# Patient Record
Sex: FEMALE | Race: WHITE | URBAN - METROPOLITAN AREA
[De-identification: names, ages, dates, MRNs, and addresses within clinical notes are randomized per-mention and may not be internally consistent; named-entity substitution may affect disease eponyms.]

---

## 2021-08-17 ENCOUNTER — OFFICE VISIT (OUTPATIENT)
Dept: FAMILY MEDICINE CLINIC | Facility: CLINIC | Age: 26
End: 2021-08-17
Payer: COMMERCIAL

## 2021-08-17 VITALS
TEMPERATURE: 98 F | OXYGEN SATURATION: 98 % | RESPIRATION RATE: 16 BRPM | HEIGHT: 67 IN | DIASTOLIC BLOOD PRESSURE: 60 MMHG | BODY MASS INDEX: 25.9 KG/M2 | HEART RATE: 64 BPM | SYSTOLIC BLOOD PRESSURE: 100 MMHG | WEIGHT: 165 LBS

## 2021-08-17 DIAGNOSIS — R11.0 NAUSEA: ICD-10-CM

## 2021-08-17 DIAGNOSIS — U07.1 COVID-19: Primary | ICD-10-CM

## 2021-08-17 DIAGNOSIS — J02.9 SORE THROAT: ICD-10-CM

## 2021-08-17 LAB
CONTROL LINE PRESENT WITH A CLEAR BACKGROUND (YES/NO): YES YES/NO
KIT LOT #: NORMAL NUMERIC
OPERATOR ID: ABNORMAL
POCT LOT NUMBER: ABNORMAL
RAPID SARS-COV-2 BY PCR: DETECTED
STREP GRP A CUL-SCR: NEGATIVE

## 2021-08-17 PROCEDURE — 99203 OFFICE O/P NEW LOW 30 MIN: CPT | Performed by: FAMILY MEDICINE

## 2021-08-17 PROCEDURE — 3008F BODY MASS INDEX DOCD: CPT | Performed by: FAMILY MEDICINE

## 2021-08-17 PROCEDURE — 87880 STREP A ASSAY W/OPTIC: CPT | Performed by: FAMILY MEDICINE

## 2021-08-17 PROCEDURE — 3074F SYST BP LT 130 MM HG: CPT | Performed by: FAMILY MEDICINE

## 2021-08-17 PROCEDURE — 3078F DIAST BP <80 MM HG: CPT | Performed by: FAMILY MEDICINE

## 2021-08-17 PROCEDURE — U0002 COVID-19 LAB TEST NON-CDC: HCPCS | Performed by: FAMILY MEDICINE

## 2021-08-17 RX ORDER — ONDANSETRON 4 MG/1
4 TABLET, ORALLY DISINTEGRATING ORAL EVERY 6 HOURS PRN
Qty: 10 TABLET | Refills: 0 | Status: SHIPPED | OUTPATIENT
Start: 2021-08-17

## 2021-08-17 RX ORDER — HYDROXYZINE HYDROCHLORIDE 10 MG/1
10 TABLET, FILM COATED ORAL AS NEEDED
COMMUNITY

## 2021-08-17 NOTE — PROGRESS NOTES
Yomi Alaniz is a 23year old female. HPI:   Patient presents with:  Nausea: 1-2 weeks   Sore Throat: bumps on lips and cough s/s for 2-3 days      This 16-year-old female presents to the office with complaint of nausea for the last 1 to 2 weeks. negative.     PHYSICAL EXAM:   /60   Pulse 64   Temp 97.6 °F (36.4 °C) (Tympanic)   Resp 16   Ht 5' 7\" (1.702 m)   Wt 165 lb (74.8 kg)   LMP 08/03/2021 (Exact Date)   SpO2 98%   Breastfeeding No   BMI 25.84 kg/m²     General: Well-nourished, well hyd nausea or vomiting. The patient was advised to go to the emergency room if she developed any chest pain, increased difficulty breathing, persistent vomiting or any other worsening symptoms. She is to follow-up with her primary doctor in 3 days.   Work not

## 2021-08-17 NOTE — PATIENT INSTRUCTIONS
Take the Zofran 4 mg ODT one tablet every 6 hours as needed for nausea or vomiting. Push fluids and stay well hydrated. Go to the ER if you develop chest pain, increased difficulty breathing, persistent vomiting or any other worsening symptoms. and needs. Follow the recommendations of your local public health department if you need to quarantine.  Options they will consider include stopping quarantine  • After 14 days from date of last exposure  • After 10 days without testing from date of last ex with other people in your household, like dishes, towels, and bedding   10. Clean all surfaces that are touched often, like counters, tabletops, and doorknobs. Use household cleaning sprays or wipes according to the label instructions.          Seek Further isolation instructions. Your test results will be called to you from an [facility] representative. If you have not received a call within 2 business days, please call your primary care provider or check [facility] for results.     Post-Discharge Follow-u information at the following websites:   Centers for Disease Control & Prevention (CDC)  What to do if you are sick with coronavirus disease 2019, [employer]. pdf  Centers for Disease C COVID 19 vaccine   • Wear a mask in public  • Avoid large gatherings  • Wash your hands frequently  • Stay at least 6 feet away from other people    References:  [employer]: Why some people experience long-term coronavirus symptoms. (2021, February 08).

## (undated) NOTE — LETTER
Date: 8/17/2021    Patient Name: Hector Garcia          To Whom it may concern: The above patient was seen at the [facility] for treatment of a medical condition.     This patient left work early on 8/12/21 and was off work until seen